# Patient Record
Sex: FEMALE | Race: BLACK OR AFRICAN AMERICAN | NOT HISPANIC OR LATINO | Employment: UNEMPLOYED | URBAN - METROPOLITAN AREA
[De-identification: names, ages, dates, MRNs, and addresses within clinical notes are randomized per-mention and may not be internally consistent; named-entity substitution may affect disease eponyms.]

---

## 2019-02-18 LAB
INFLUENZA A ANTIGEN, POC: NEGATIVE
INFLUENZA B ANTIGEN, POC: NEGATIVE
RAPID GROUP A STREP (OHS): POSITIVE

## 2022-06-25 ENCOUNTER — LAB VISIT (OUTPATIENT)
Dept: LAB | Facility: HOSPITAL | Age: 42
End: 2022-06-25
Attending: PSYCHIATRY & NEUROLOGY
Payer: COMMERCIAL

## 2022-06-25 ENCOUNTER — HOSPITAL ENCOUNTER (OUTPATIENT)
Dept: CARDIOLOGY | Facility: HOSPITAL | Age: 42
Discharge: HOME | End: 2022-06-25
Attending: OBSTETRICS & GYNECOLOGY
Payer: COMMERCIAL

## 2022-06-25 DIAGNOSIS — Z13.9 SCREENING FOR UNSPECIFIED CONDITION: Primary | ICD-10-CM

## 2022-06-25 DIAGNOSIS — E13.22: ICD-10-CM

## 2022-06-25 DIAGNOSIS — N18.1: ICD-10-CM

## 2022-06-25 DIAGNOSIS — Z13.9 SCREENING FOR UNSPECIFIED CONDITION: ICD-10-CM

## 2022-06-25 DIAGNOSIS — F33.2 SEVERE RECURRENT MAJOR DEPRESSION WITHOUT PSYCHOTIC FEATURES: ICD-10-CM

## 2022-06-25 DIAGNOSIS — F90.0 ADHD, PREDOMINANTLY INATTENTIVE TYPE: ICD-10-CM

## 2022-06-25 LAB
ALBUMIN SERPL-MCNC: 3.5 GM/DL (ref 3.5–5)
ALBUMIN/GLOB SERPL: 1 RATIO (ref 1.1–2)
ALP SERPL-CCNC: 93 UNIT/L (ref 40–150)
ALT SERPL-CCNC: 15 UNIT/L (ref 0–55)
APPEARANCE UR: CLEAR
AST SERPL-CCNC: 19 UNIT/L (ref 5–34)
BACTERIA #/AREA URNS AUTO: NORMAL /HPF
BASOPHILS # BLD AUTO: 0.03 X10(3)/MCL (ref 0–0.2)
BASOPHILS NFR BLD AUTO: 0.3 %
BILIRUB UR QL STRIP.AUTO: NEGATIVE MG/DL
BILIRUBIN DIRECT+TOT PNL SERPL-MCNC: 0.5 MG/DL
BUN SERPL-MCNC: 9.8 MG/DL (ref 7–18.7)
CALCIUM SERPL-MCNC: 9.5 MG/DL (ref 8.4–10.2)
CHLORIDE SERPL-SCNC: 107 MMOL/L (ref 98–107)
CO2 SERPL-SCNC: 28 MMOL/L (ref 22–29)
COLOR UR AUTO: YELLOW
CREAT SERPL-MCNC: 0.92 MG/DL (ref 0.55–1.02)
DEPRECATED CALCIDIOL+CALCIFEROL SERPL-MC: 18.2 NG/ML (ref 30–80)
EOSINOPHIL # BLD AUTO: 0.08 X10(3)/MCL (ref 0–0.9)
EOSINOPHIL NFR BLD AUTO: 0.8 %
ERYTHROCYTE [DISTWIDTH] IN BLOOD BY AUTOMATED COUNT: 14.5 % (ref 11.5–17)
GLOBULIN SER-MCNC: 3.4 GM/DL (ref 2.4–3.5)
GLUCOSE SERPL-MCNC: 128 MG/DL (ref 74–100)
GLUCOSE UR QL STRIP.AUTO: NEGATIVE MG/DL
HCT VFR BLD AUTO: 42.2 % (ref 37–47)
HGB BLD-MCNC: 12.5 GM/DL (ref 12–16)
IMM GRANULOCYTES # BLD AUTO: 0.02 X10(3)/MCL (ref 0–0.02)
IMM GRANULOCYTES NFR BLD AUTO: 0.2 % (ref 0–0.43)
KETONES UR QL STRIP.AUTO: NEGATIVE MG/DL
LEUKOCYTE ESTERASE UR QL STRIP.AUTO: ABNORMAL UNIT/L
LYMPHOCYTES # BLD AUTO: 3.62 X10(3)/MCL (ref 0.6–4.6)
LYMPHOCYTES NFR BLD AUTO: 34.7 %
MCH RBC QN AUTO: 22.6 PG (ref 27–31)
MCHC RBC AUTO-ENTMCNC: 29.6 MG/DL (ref 33–36)
MCV RBC AUTO: 76.3 FL (ref 80–94)
MONOCYTES # BLD AUTO: 0.73 X10(3)/MCL (ref 0.1–1.3)
MONOCYTES NFR BLD AUTO: 7 %
NEUTROPHILS # BLD AUTO: 5.9 X10(3)/MCL (ref 2.1–9.2)
NEUTROPHILS NFR BLD AUTO: 57 %
NITRITE UR QL STRIP.AUTO: NEGATIVE
PH UR STRIP.AUTO: 6 [PH]
PLATELET # BLD AUTO: 242 X10(3)/MCL (ref 130–400)
PMV BLD AUTO: 11.2 FL (ref 9.4–12.4)
POTASSIUM SERPL-SCNC: 4.4 MMOL/L (ref 3.5–5.1)
PROT SERPL-MCNC: 6.9 GM/DL (ref 6.4–8.3)
PROT UR QL STRIP.AUTO: NEGATIVE MG/DL
RBC # BLD AUTO: 5.53 X10(6)/MCL (ref 4.2–5.4)
RBC #/AREA URNS AUTO: NORMAL /HPF
RBC UR QL AUTO: NEGATIVE UNIT/L
SODIUM SERPL-SCNC: 143 MMOL/L (ref 136–145)
SP GR UR STRIP.AUTO: >=1.03
SQUAMOUS #/AREA URNS AUTO: NORMAL /HPF
T4 SERPL-MCNC: 8.02 UG/DL (ref 4.87–11.72)
TSH SERPL-ACNC: 2.2 UIU/ML (ref 0.35–4.94)
UROBILINOGEN UR STRIP-ACNC: 0.2 MG/DL
WBC # SPEC AUTO: 10.4 X10(3)/MCL (ref 4.5–11.5)
WBC #/AREA URNS AUTO: NORMAL /HPF

## 2022-06-25 PROCEDURE — 81001 URINALYSIS AUTO W/SCOPE: CPT

## 2022-06-25 PROCEDURE — 85025 COMPLETE CBC W/AUTO DIFF WBC: CPT

## 2022-06-25 PROCEDURE — 84443 ASSAY THYROID STIM HORMONE: CPT

## 2022-06-25 PROCEDURE — 36415 COLL VENOUS BLD VENIPUNCTURE: CPT

## 2022-06-25 PROCEDURE — 80053 COMPREHEN METABOLIC PANEL: CPT

## 2022-06-25 PROCEDURE — 93010 ELECTROCARDIOGRAM REPORT: CPT | Mod: ,,, | Performed by: INTERNAL MEDICINE

## 2022-06-25 PROCEDURE — 84436 ASSAY OF TOTAL THYROXINE: CPT

## 2022-06-25 PROCEDURE — 93041 RHYTHM ECG TRACING: CPT

## 2022-06-25 PROCEDURE — 93010 EKG 12-LEAD: ICD-10-PCS | Mod: ,,, | Performed by: INTERNAL MEDICINE

## 2022-06-25 PROCEDURE — 82306 VITAMIN D 25 HYDROXY: CPT

## 2022-09-17 ENCOUNTER — HISTORICAL (OUTPATIENT)
Dept: ADMINISTRATIVE | Facility: HOSPITAL | Age: 42
End: 2022-09-17
Payer: COMMERCIAL

## 2025-03-25 ENCOUNTER — HOSPITAL ENCOUNTER (OUTPATIENT)
Dept: RADIOLOGY | Facility: HOSPITAL | Age: 45
Discharge: HOME | End: 2025-03-25
Attending: NURSE PRACTITIONER
Payer: COMMERCIAL

## 2025-03-25 DIAGNOSIS — Z12.31 ENCOUNTER FOR SCREENING MAMMOGRAM FOR BREAST CANCER: ICD-10-CM

## 2025-03-25 PROCEDURE — 77063 BREAST TOMOSYNTHESIS BI: CPT | Mod: 26,,, | Performed by: RADIOLOGY

## 2025-03-25 PROCEDURE — 77063 BREAST TOMOSYNTHESIS BI: CPT | Mod: TC

## 2025-03-25 PROCEDURE — 77067 SCR MAMMO BI INCL CAD: CPT | Mod: 26,,, | Performed by: RADIOLOGY

## 2025-07-14 ENCOUNTER — HOSPITAL ENCOUNTER (EMERGENCY)
Facility: HOSPITAL | Age: 45
Discharge: HOME | End: 2025-07-14
Attending: SPECIALIST
Payer: COMMERCIAL

## 2025-07-14 VITALS
RESPIRATION RATE: 20 BRPM | WEIGHT: 270 LBS | HEIGHT: 65 IN | BODY MASS INDEX: 44.98 KG/M2 | SYSTOLIC BLOOD PRESSURE: 150 MMHG | TEMPERATURE: 98 F | OXYGEN SATURATION: 99 % | HEART RATE: 78 BPM | DIASTOLIC BLOOD PRESSURE: 81 MMHG

## 2025-07-14 DIAGNOSIS — T88.7XXA MEDICATION SIDE EFFECT: Primary | ICD-10-CM

## 2025-07-14 DIAGNOSIS — T88.7XXA: ICD-10-CM

## 2025-07-14 DIAGNOSIS — T36.0X5A: ICD-10-CM

## 2025-07-14 PROCEDURE — 99284 EMERGENCY DEPT VISIT MOD MDM: CPT

## 2025-07-14 PROCEDURE — 25000003 PHARM REV CODE 250: Performed by: SPECIALIST

## 2025-07-14 PROCEDURE — 63600175 PHARM REV CODE 636 W HCPCS: Performed by: SPECIALIST

## 2025-07-14 RX ORDER — HYDROXYZINE PAMOATE 25 MG/1
25 CAPSULE ORAL EVERY 4 HOURS PRN
Qty: 20 CAPSULE | Refills: 0 | Status: SHIPPED | OUTPATIENT
Start: 2025-07-14

## 2025-07-14 RX ORDER — PREDNISONE 20 MG/1
20 TABLET ORAL 2 TIMES DAILY
Qty: 6 TABLET | Refills: 0 | Status: SHIPPED | OUTPATIENT
Start: 2025-07-14 | End: 2025-07-17

## 2025-07-14 RX ORDER — DIPHENHYDRAMINE HCL 25 MG
25 CAPSULE ORAL
Status: COMPLETED | OUTPATIENT
Start: 2025-07-14 | End: 2025-07-14

## 2025-07-14 RX ORDER — PREDNISONE 20 MG/1
40 TABLET ORAL
Status: COMPLETED | OUTPATIENT
Start: 2025-07-14 | End: 2025-07-14

## 2025-07-14 RX ADMIN — PREDNISONE 40 MG: 20 TABLET ORAL at 09:07

## 2025-07-14 RX ADMIN — DIPHENHYDRAMINE HYDROCHLORIDE 25 MG: 25 CAPSULE ORAL at 09:07

## 2025-07-15 NOTE — DISCHARGE INSTRUCTIONS
Discontinue Augmentin and Norco;     AVOID future prescriptions of penicillin;    THIS IS LIKELY A HYPERSENSITIVITY REACTION TO PENICILLIN AND IF TAKEN AGAIN YOU COULD HAVE ANAPHYLAXIS AND DIFFICULTY BREATHING

## 2025-07-15 NOTE — ED PROVIDER NOTES
Encounter Date: 7/14/2025       History     Chief Complaint   Patient presents with    Allergic Reaction     Started on antibiotic today and norco. This was given cause a tooth was pull. Augmentin only one dose. Itching all over     45 year old female started on Augmentin and Norco today following tooth extraction, now has pruritus; medications taken a few hours ago; she has no difficulty swallowing, no wheezing, no nausea, no chest pain;  No history of medication allergy    The history is provided by the patient.     Review of patient's allergies indicates:  No Known Allergies  No past medical history on file.  No past surgical history on file.  No family history on file.  Social History[1]  Review of Systems   Constitutional: Negative.    HENT: Negative.     Respiratory: Negative.     Cardiovascular: Negative.    Gastrointestinal: Negative.    Musculoskeletal: Negative.    Skin: Negative.    Neurological: Negative.    All other systems reviewed and are negative.      Physical Exam     Initial Vitals [07/14/25 2052]   BP Pulse Resp Temp SpO2   (!) 150/81 78 20 98 °F (36.7 °C) 99 %      MAP       --         Physical Exam    Nursing note and vitals reviewed.  Constitutional: She appears well-developed and well-nourished.   HENT:   Head: Normocephalic and atraumatic. Mouth/Throat: Oropharynx is clear and moist.   Extracted left upper molar site, minimal erythema, no exudate   Eyes: EOM are normal. Pupils are equal, round, and reactive to light.   Neck:   Normal range of motion.  Cardiovascular:  Normal rate, regular rhythm and normal heart sounds.           Pulmonary/Chest: Breath sounds normal. She has no wheezes.   Musculoskeletal:         General: Normal range of motion.      Cervical back: Normal range of motion.     Neurological: She is alert and oriented to person, place, and time. She has normal strength.   Skin: Skin is warm and dry.   Areas of erythema to the inner arms trunk and legs  Dermatographia  "        ED Course   Procedures  Labs Reviewed - No data to display       Imaging Results    None          Medications   diphenhydrAMINE capsule 25 mg (25 mg Oral Given 7/14/25 2111)   predniSONE tablet 40 mg (40 mg Oral Given 7/14/25 2111)     Medical Decision Making  Differential diagnosis- HYPERSENSITIVITY REACTION, medication side-effect, allergy    Risk  OTC drugs.  Prescription drug management.  Risk Details: PATIENT GIVEN PREDNISONE 40 MG P.O. AND BENADRYL 25 MG P.O.     Discontinue Augmentin and Norco;     AVOID future prescriptions of penicillin;    THIS IS LIKELY A HYPERSENSITIVITY REACTION TO PENICILLIN AND IF TAKEN AGAIN YOU COULD HAVE ANAPHYLAXIS AND DIFFICULTY BREATHING    Patient understands to return immediately if she develops wheezing or difficulty swallowing                Patient Vitals for the past 24 hrs:   BP Temp Temp src Pulse Resp SpO2 Height Weight   07/14/25 2052 (!) 150/81 98 °F (36.7 °C) Oral 78 20 99 % 5' 5" (1.651 m) 122.5 kg (270 lb)                           Clinical Impression:  Final diagnoses:  [T88.7XXA] Medication side effect (Primary)  [T88.7XXA, T36.0X5A] Penicillin-induced hypersensitivity syndrome, initial encounter          ED Disposition Condition    Discharge Stable          ED Prescriptions       Medication Sig Dispense Start Date End Date Auth. Provider    predniSONE (DELTASONE) 20 MG tablet Take 1 tablet (20 mg total) by mouth 2 (two) times daily. for 3 days 6 tablet 7/14/2025 7/17/2025 Franklin Reagan MD    hydrOXYzine pamoate (VISTARIL) 25 MG Cap Take 1 capsule (25 mg total) by mouth every 4 (four) hours as needed (Itching). 20 capsule 7/14/2025 -- Franklin Reagan MD          Follow-up Information    None                [1]         Franklin Reagan MD  07/14/25 9927    "

## 2025-07-30 ENCOUNTER — NUTRITION (OUTPATIENT)
Dept: NUTRITION | Facility: HOSPITAL | Age: 45
End: 2025-07-30
Payer: COMMERCIAL

## 2025-07-30 DIAGNOSIS — R63.5 WEIGHT GAIN: Primary | ICD-10-CM

## 2025-07-30 PROCEDURE — 97802 MEDICAL NUTRITION INDIV IN: CPT

## 2025-07-30 NOTE — PROGRESS NOTES
Nutrition Note: 2025   Referring Provider: Annia Page FNP  Reason for visit: Obesity/Weight Management   Consultation Time: 60 Minutes     A = NUTRITION ASSESSMENT   Patient Information:    Leilani Hammer  : 1980   45 y.o. female    Allergies/Intolerances: No known food allergies  Social Data: lives with childern. Accompanied by self.  Anthropometrics:     Wt: 122.7 kg (25,pt stated)                                  Ht 5'5 (165.1 cm)  BMI: 45.0 kg/m2  IBW: 56.8  kg (216% IBW)    Nutrition Risk: Patient does not meet least 2 characteristics of the ASPEN/AND criteria for Malnutrition in context     Energy Intake [Comment]: adequate, 3 meals per and snacks  Interpretation of Weight Loss [Comment]: doesn't meet criteria  Physical Findings (Body Fat) [Comment]: doesn't meet criteria  Physical Findings (Muscle Mass) [Comment]: doesn't meet criteria  Fluid Accumulation [Comment ]: unable to assess  Other:     Supplements/Vitamins:    MVI/Supp: Reviewed  Drug/Nutrient interactions: Reviewed Activity Level:     Active      Form of Activity: 5 -6 day per week (45 minutes strength, cardio), 15 minutes walk after     Food/Nutrition-related hx:      Food Security  Is patient able to sufficiently able to prepare meals at home? [x] Yes  [] No []N/A  If no, does patient have help cooking, preparing, and serving meals at home? [] Yes  [] No [x] N/A    Diet/PO Recall:   Appetite: Good  Fluid Intake: Adequate  Diet Recall:  Breakfast: boiled egg and honey wheat toast, cantaloupe  Lunch: chicken sweet potato with onions/peppers, cucumber  Dinner: meatball, lentil/rice, spaghetti sauce  Snacks: 2-3x/day  Drinks: 64 ounces water, juice, coffee  ONS: protein shake   N/V/C/D: No GI Symptoms Noted  Eating out: 0-1x/week.   Cultural/Spiritual/Personal Preferences: likes fruit/vegetables  Patient Notes/Reports: 25: Nutrition consult for diet education wt loss and pre-diabetes. Pt reports changing her diet. See  diet recall. Pt HbgA1c 6.2. Pt stated cutting out sodas, sweets, candies. Pt has brought food scale and has been portion her meals and snack and has been keeping food diary on My Fitness Pal. Pt also has been exercising almost every day. Prior to not portioning meals, pt stated wasn't losing weight. Pt doesn't want to be on glucose medications, so she trying diet. Pt has portion her meal and pt has already lost 4.5 kg (10#) since March, UBW: 127.2 kg. Provided education on choosing whole grains, lean meat, fruits/vegetables, yogurt, portion control serving sizes, weight loss tips, goals for wt loss as well. Labs/medications review. Pt on vitamin D. Will follow up in 2 months.    Medical Tests and Procedures:  Problem List[1]   No past medical history on file.  No past surgical history on file.    No family history on file.  Social History     Socioeconomic History    Marital status: Single     Current Outpatient Medications   Medication Instructions    hydrOXYzine pamoate (VISTARIL) 25 mg, Oral, Every 4 hours PRN      Labs:    Lab Results   Component Value Date    WBC 12.1 (H) 03/25/2025    HGB 12 03/25/2025    HCT 40.3 03/25/2025    CHOL 153 03/25/2025    TRIG 99 03/25/2025    HDL 42 03/25/2025    LDLCALC 93 03/25/2025    HGBA1C 6.2 (H) 03/25/2025     06/25/2022    K 4.4 06/25/2022    CALCIUM 9.5 06/25/2022         D = NUTRITION DIAGNOSIS    PES Statement:   Primary Problem: Overweight/Obesity  related to lack of nutrition-related education for weight loss as evidenced by BMI 45.0 kg/m2.           I = NUTRITION INTERVENTION   Discussed healthy plate method on healthy eating, incorporating more fruits/vegetables, whole grains, and eliminating high calorie/sugary beverages.  Discussed sugary foods and/or beverages (potential health consequences of excessive sugar intake, ways to reduce sugar intake, healthy beverage alternatives, etc.). Discussed recommended servings of fruit/vegetable per day and food sources  of such at age appropriate serving sizes.  Discussed with pt/family the need to ensure regular meals and snacks throughout the day.  Discussed nutrient-dense meals and snacks versus empty-calories.  Discussed recommended fiber intake and food sources of such. Complimented patient on dietary compliance/modifications and resulting health improvements. Complimented patient on physical activity efforts.  Answered parents/patient's questions appropriately.      Patient  active and engaged during session and verbalized desire to make changes. Contact information provided, understanding verbalized and compliance expected.   Estimated Energy/Fluid Requirements:   Weight used: .7 kg  Calories: 1718 kcal/day (14 kcal/kg)  Protein: 98 -123 g/day (0.8-1.0 g/kg )  Fluid: 1718 mL/day (1 mL/kcal) or  per MD.    Education Materials Provided:   Nutrition Plan   Recommendations:   Ensure balanced eating pattern of 3 meals, 1-2 snacks daily. Avoid skipped meals.    Keep portions appropriate using body (fist, palm, etc)   Create nutrient-dense meals and snacks. Focus on adequate nutrition including lean proteins, whole grains/fiber, and increasing overall fruit/vegetable intake.    Avoid/limit fried foods, fast food, and high calorie beverages. Shoot for limiting fast food to 1x/week.    Meet physical activity goal of 60 minutes daily.    Use positive reinforcement around trying new foods.         M/E = NUTRITION MONITORING AND EVALUATION   Goal 1: Diet recall shows decrease/improvements in high calorie foods/drinks and consuming balanced eating pattern including lean proteins, whole grains, and fruit/vegetables by next RD visit.   Indicator: Weight/BMI    Goal 2: Diet recall shows decrease/improvements in high calorie foods/drinks and consuming balanced eating pattern including lean proteins, whole grains, and fruit/vegetables by next RD visit.   Indicator: Diet Recall     F/U:  2 Months    Communication with provider via  Epic    Signature: Leah Gabriel RDN, LDN          [1] There is no problem list on file for this patient.

## 2025-07-30 NOTE — LETTER
July 30, 2025        Annia Page, MICHAEL  [address] [place] [address]             [facility]  [address] [address]  Phone: [phone]   Patient: Leilani Hammer   MR Number: [id number]   YOB: 1980   Date of Visit: 7/30/2025       Dear Dr. Page:    Thank you for referring Leilani Hammer to me for evaluation. Below are the relevant portions of my assessment and plan of care.            If you have questions, please do not hesitate to call me. I look forward to following Leilani along with you.    Sincerely,      Leah Gabriel, RD           CC  No Recipients